# Patient Record
Sex: MALE | Race: WHITE | NOT HISPANIC OR LATINO | Employment: OTHER | URBAN - METROPOLITAN AREA
[De-identification: names, ages, dates, MRNs, and addresses within clinical notes are randomized per-mention and may not be internally consistent; named-entity substitution may affect disease eponyms.]

---

## 2024-01-30 ENCOUNTER — LAB VISIT (OUTPATIENT)
Dept: LAB | Facility: HOSPITAL | Age: 69
End: 2024-01-30
Attending: UROLOGY
Payer: MEDICARE

## 2024-01-30 ENCOUNTER — PATIENT MESSAGE (OUTPATIENT)
Dept: UROLOGY | Facility: CLINIC | Age: 69
End: 2024-01-30

## 2024-01-30 ENCOUNTER — OFFICE VISIT (OUTPATIENT)
Dept: UROLOGY | Facility: CLINIC | Age: 69
End: 2024-01-30
Payer: MEDICARE

## 2024-01-30 VITALS
WEIGHT: 250 LBS | DIASTOLIC BLOOD PRESSURE: 83 MMHG | BODY MASS INDEX: 33.86 KG/M2 | HEART RATE: 76 BPM | HEIGHT: 72 IN | SYSTOLIC BLOOD PRESSURE: 152 MMHG

## 2024-01-30 DIAGNOSIS — R97.20 ELEVATED PSA: ICD-10-CM

## 2024-01-30 DIAGNOSIS — R97.20 ELEVATED PSA: Primary | ICD-10-CM

## 2024-01-30 LAB
BILIRUBIN, UA POC OHS: NEGATIVE
BLOOD, UA POC OHS: NEGATIVE
CLARITY, UA POC OHS: CLEAR
COLOR, UA POC OHS: YELLOW
COMPLEXED PSA SERPL-MCNC: 3.6 NG/ML (ref 0–4)
GLUCOSE, UA POC OHS: NEGATIVE
KETONES, UA POC OHS: NEGATIVE
LEUKOCYTES, UA POC OHS: NEGATIVE
NITRITE, UA POC OHS: NEGATIVE
PH, UA POC OHS: 6
PROTEIN, UA POC OHS: NEGATIVE
SPECIFIC GRAVITY, UA POC OHS: 1.01
UROBILINOGEN, UA POC OHS: 0.2

## 2024-01-30 PROCEDURE — 99999PBSHW POCT URINALYSIS(INSTRUMENT): Mod: PBBFAC,,,

## 2024-01-30 PROCEDURE — 81003 URINALYSIS AUTO W/O SCOPE: CPT | Mod: PBBFAC,PO | Performed by: UROLOGY

## 2024-01-30 PROCEDURE — 99999 PR PBB SHADOW E&M-NEW PATIENT-LVL IV: CPT | Mod: PBBFAC,,, | Performed by: UROLOGY

## 2024-01-30 PROCEDURE — 84153 ASSAY OF PSA TOTAL: CPT | Mod: GA | Performed by: UROLOGY

## 2024-01-30 PROCEDURE — 36415 COLL VENOUS BLD VENIPUNCTURE: CPT | Performed by: UROLOGY

## 2024-01-30 PROCEDURE — G2211 COMPLEX E/M VISIT ADD ON: HCPCS | Mod: S$PBB,,, | Performed by: UROLOGY

## 2024-01-30 PROCEDURE — 99204 OFFICE O/P NEW MOD 45 MIN: CPT | Mod: PBBFAC,PO | Performed by: UROLOGY

## 2024-01-30 PROCEDURE — 99204 OFFICE O/P NEW MOD 45 MIN: CPT | Mod: S$PBB,,, | Performed by: UROLOGY

## 2024-01-30 PROCEDURE — 84154 ASSAY OF PSA FREE: CPT | Performed by: UROLOGY

## 2024-01-30 RX ORDER — MULTIVIT WITH IRON,MINERALS
TABLET ORAL
COMMUNITY
Start: 2020-03-01

## 2024-01-30 RX ORDER — AMLODIPINE BESYLATE 5 MG/1
5 TABLET ORAL
COMMUNITY

## 2024-01-30 RX ORDER — ZINC SULFATE 50(220)MG
CAPSULE ORAL
COMMUNITY
Start: 2020-03-01

## 2024-01-30 RX ORDER — NIACIN (INOSITOL NIACINATE) 400(500MG)
CAPSULE ORAL
COMMUNITY

## 2024-01-30 RX ORDER — TAMSULOSIN HYDROCHLORIDE 0.4 MG/1
1 CAPSULE ORAL
COMMUNITY

## 2024-01-30 RX ORDER — OLMESARTAN MEDOXOMIL 40 MG/1
40 TABLET ORAL
COMMUNITY
End: 2024-06-11

## 2024-01-30 NOTE — PATIENT INSTRUCTIONS
He has an elevated PSA of 4.26.  However only slightly increased from his PSA which was 7 months ago.  Did discuss waiting 3 months to rechecking a PSA to establish a base versus obtaining an MRI in wants to go ahead and proceed with MRI.  If MRI shows no lesions would recommend repeating a PSA in 3 months since digital rectal exam essentially negative.  However if PSA shows PI-RADS 3 or above will refer to [facility] for uronav biopsy and follow up here for results review.    Will also obtain screening PSA and PSA free and total at our lab to begin with.  In the future want all the labs lab.    They do live in [place] and offered to send them to [place] but they are here in [place] and in [facility] soon with family and want to keep all care here at [facility].    1. Elevated PSA        Plan:     Psa free and total AND psa screening for baseline today  Schedule prostate mri   Pirads 3-refer to main and fu here after  <Pirads 3 repeat psa free and total and fu after here with me. Bx if psa rising then.       We will obtain a prostate MRI to determine if you have any lesions that look suspicious for intermediate risk prostate cancer that would need treatment. (we call these target lesions).   If there are no target lesions This does not mean you do not have prostate cancer. You can have a false negative. . You WILL LIKELY STILL NEED a biopsy.     If your MRI shows a target lesion(s)- (>PIRADS 3).   Will refer you to [facility] urology in [place] for a special fusion prostate biopsy that uses the MRI results and ultrasound.   A urologist there will do the biopsy and you will return to see  to discuss the biopsy results  If you are unwilling to drive to [place] we could do the biopsy here but it would not be as helpful as the one done in [place]  If your MRI shows no target lesion(s) -(PIRADS 2 or less).   Will plan to repeat psa free and total in 3 months.    You still more than  likely will need a biopsy even if no lesions on MRI seen.   This does not mean you do not have prostate cancer. You can have a false negative.   A prostate biopsy is done awake at a special surgery suite. It is not painful. You will receive anti-anxiety medicine beforehand. A prostate biopsy is done by placing probe is through the rectum to obtain pieces of prostate for evaluation. There is no other way to check for prostate cancer.   The risks of a prostate biopsy include but are not limited to bleeding, infection, pain and need for further procedures.  The most serious risks is sepsis bc we go through the rectum.  The patient was told to stop all blood thinners at least one week prior to the procedure (however continue aspirin 81mg daily if told specifically by your UROLOGIST).    You will do a fleets enema the AM of the biopsy and begin a course of antibiotics beginning the PM prior to the procedure unless otherwise stated.   At minimum make sure you have a follow-up to see me in 6 months with a PSA beforehand and call our office if no appointment made for a lab or follow-up

## 2024-01-30 NOTE — PROGRESS NOTES
Psa back down to 3.6  Ok to proceed with mri (Not urgent) or just repeat psa in 3 months and fu after and do mri then if psa higher.

## 2024-01-30 NOTE — PROGRESS NOTES
[facility] - [place]  Staff: MD Ryan  PCP: Carole, Primary Doctor  Date of Service: 01/30/2024    CC: elevated psa      Subjective:        HPI: Zion Estrada is a 68 y.o. male     Initial consult by me in clinic for elevated psa on 1/30/24:    He was found to have an elevated PSA of 4.12 on 05/18/2023, it was repeated on 08/16/2023 and came down to 3.65 after using a prostate supplmenet and most recently back up to 4.26 on 12/20/2023.  Does not recall his PSAs prior to this but did have them checked previously.      He has not seen a urologist before.   Family history: He does not have a family history of prostate cancer.   BPH obstruction sx and bph meds: occ weak stream, no intermittency or hesitancy however his pcp did start him on flomax 0.4mg after his psa in December and he did notice a difference in his stream and now wakes up 2x a night down from 4 to 5x a night.   Not sexually active currently as wife had urogyn surgeries but would like to be so not sure if he has ED.   Contraindications for mri: No. Anticoagulation:  Yes - asa 81mg.  He has no history of previous abdominal surgeries.   He does have a cardiologist  ( at [facility]). No previous heart surgeries. Last colonoscopy 6 years ago- 1 polyp.   Ua today: neg.     Psa history: no family hx  1/30/24 BENJAMIN: 35g no nodules  12/20/23 4.26  8/16/23 3.65  5/18/23 4.12    Urine history: family history of kidney, bladder or prostate cancer:No, personal or family history of kidney stones: No,tobacco use: No, anticoagulation: Yes - asa 81mg  1/30/24 neg    Current REVIEW OF SYSTEMS:  See hpi for pertinent information    Past Medical History:   Diagnosis Date    Elevated PSA     Hypertension        Past Surgical History:   Procedure Laterality Date    VASECTOMY         Objective:     Vitals:    01/30/24 1019   BP: (!) 152/83   Pulse: 76       Focused  exam 1/30/24  Inspection of anus normal  No scrotal  "rashes, cysts or lesions  Epididymis normal in size, no tenderness  Testes normal and size, equal size bilaterally, no masses  Urethral meatus normal without discharge  Penis is circumcised  BENJAMIN: 35g gland without masses, tenderness. SV not palpable. Normal sphincter tone. +hemhorroids.  No bilateral inguinal hernias noted       ]      No results found for: "LABA1C", "HGBA1C"        Assessment:     Zion Estrada is a 68 y.o. male with     He has an elevated PSA of 4.26.  However only slightly increased from his PSA which was 7 months ago.  Did discuss waiting 3 months to rechecking a PSA to establish a base versus obtaining an MRI in wants to go ahead and proceed with MRI.  If MRI shows no lesions would recommend repeating a PSA in 3 months since digital rectal exam essentially negative.  However if PSA shows PI-RADS 3 or above will refer to [facility] for uronav biopsy and follow up here for results review.    Will also obtain screening PSA and PSA free and total at our lab to begin with.  In the future want all the labs lab.    They do live in [place] and offered to send them to [place] but they are here in [place] and in [facility] soon with family and want to keep all care here at [facility].    1. Elevated PSA        Plan:     Psa free and total AND psa screening for baseline today  Schedule prostate mri (2/8 to 2/20)  Pirads 3-refer to main and fu here after  <Pirads 3 repeat psa free and total and fu after here with me. Bx if psa rising then.       We will obtain a prostate MRI to determine if you have any lesions that look suspicious for intermediate risk prostate cancer that would need treatment. (we call these target lesions).   If there are no target lesions This does not mean you do not have prostate cancer. You can have a false negative. . You WILL LIKELY STILL NEED a biopsy.     If your MRI shows a target lesion(s)- (>PIRADS 3).   Will refer you to [facility] urology in [place] for a " special fusion prostate biopsy that uses the MRI results and ultrasound.   A urologist there will do the biopsy and you will return to see  to discuss the biopsy results  If you are unwilling to drive to [place] we could do the biopsy here but it would not be as helpful as the one done in [place]  If your MRI shows no target lesion(s) -(PIRADS 2 or less).   Will plan to repeat psa free and total in 3 months.    You still more than likely will need a biopsy even if no lesions on MRI seen.   This does not mean you do not have prostate cancer. You can have a false negative.   A prostate biopsy is done awake at a special surgery suite. It is not painful. You will receive anti-anxiety medicine beforehand. A prostate biopsy is done by placing probe is through the rectum to obtain pieces of prostate for evaluation. There is no other way to check for prostate cancer.   The risks of a prostate biopsy include but are not limited to bleeding, infection, pain and need for further procedures.  The most serious risks is sepsis bc we go through the rectum.  The patient was told to stop all blood thinners at least one week prior to the procedure (however continue aspirin 81mg daily if told specifically by your UROLOGIST).    You will do a fleets enema the AM of the biopsy and begin a course of antibiotics beginning the PM prior to the procedure unless otherwise stated.   At minimum make sure you have a follow-up to see me in 6 months with a PSA beforehand and call our office if no appointment made for a lab or follow-up

## 2024-01-31 ENCOUNTER — TELEPHONE (OUTPATIENT)
Dept: UROLOGY | Facility: CLINIC | Age: 69
End: 2024-01-31
Payer: MEDICARE

## 2024-01-31 LAB
PROSTATE SPECIFIC ANTIGEN, TOTAL: 3.7 NG/ML (ref 0–4)
PSA FREE MFR SERPL: 20.81 %
PSA FREE SERPL-MCNC: 0.77 NG/ML (ref 0–1.5)

## 2024-01-31 NOTE — TELEPHONE ENCOUNTER
----- Message from Terra Valencia sent at 1/31/2024  8:29 AM CST -----  Contact: pt [phone]  Type:  Patient Returning Call    Who Called:  Pt   Who Left Message for Patient:  Kitty   Does the patient know what this is regarding?:  Yes   Best Call Back Number:  [phone]    Additional Information:  Pls call back and advise

## 2024-02-05 ENCOUNTER — PATIENT MESSAGE (OUTPATIENT)
Dept: UROLOGY | Facility: CLINIC | Age: 69
End: 2024-02-05
Payer: MEDICARE

## 2024-02-05 ENCOUNTER — HOSPITAL ENCOUNTER (OUTPATIENT)
Dept: RADIOLOGY | Facility: HOSPITAL | Age: 69
Discharge: HOME | End: 2024-02-05
Attending: UROLOGY
Payer: MEDICARE

## 2024-02-05 DIAGNOSIS — R97.20 ELEVATED PSA: ICD-10-CM

## 2024-02-05 LAB
CREAT SERPL-MCNC: 0.7 MG/DL (ref 0.5–1.4)
SAMPLE: NORMAL

## 2024-02-05 PROCEDURE — 25500020 PHARM REV CODE 255

## 2024-02-05 PROCEDURE — A9585 GADOBUTROL INJECTION: HCPCS

## 2024-02-05 PROCEDURE — 72197 MRI PELVIS W/O & W/DYE: CPT | Mod: TC

## 2024-02-05 PROCEDURE — 72197 MRI PELVIS W/O & W/DYE: CPT | Mod: 26,,, | Performed by: RADIOLOGY

## 2024-02-05 RX ORDER — GADOBUTROL 604.72 MG/ML
INJECTION INTRAVENOUS
Status: COMPLETED
Start: 2024-02-05 | End: 2024-02-05

## 2024-02-05 RX ADMIN — GADOBUTROL 10 ML: 604.72 INJECTION INTRAVENOUS at 01:02

## 2024-02-05 NOTE — PROGRESS NOTES
Let him know his mri prostate showed a pirads 3 lesion. Which means there's a potential there could be cancer there but very low chance. Since his psa came back down to what it was in August I think we should just repeat his psa in 6 months and fu after that.     If he's good with this plan schedule him for psa free and total in 6 months and fu after this.     Psa history: no family hx  1/30/24            mri prostate vol: 53g. Pirads 3, right transition zone at the apex.  1/30/24            3.6,   BENJAMIN: 35g no nodules - 3.7, %free 20.81  12/20/23          4.26  8/16/23            3.65  5/18/23            4.12        PIRADS scale:  1 - Very low, clinically significant cancer unlikely to be present  2- Low, clinically significant cancer unlikely to be present  3- Intermediate, the presence of clinically sig cancer is equivocal  4- High, clinically sig cancer likely present  5 - Very High, clinically sig cancer highly likely to be present      Risk Stratification of Prostate Cancer According to PI-RADS® Version 2 Categories: [place]-Analysis for Prospective Studies  Epub 2020 Jul 27  https://pubmed.ncbi.nlm.nih.gov/63211968/  Results: Thirteen prospective studies including 4,265 men who underwent magnetic resonance imaging targeted biopsy and/or systematic biopsy for a PI-RADS v2 category 3 or greater, or systematic biopsy for PI-RADS 1-2 were included.     The pooled detection rates of clinically significant prostate cancer ([place] 7 or higher- meaning prostate cancer that would need treatment) monotonically increased for each   PI-RADS v2 category  4% (95% CI 2-8) for category 1-2  17% (95% CI 13-21) for category 3  46% (95% CI 38-55) for category 4  75% (95% CI 73-78) for category 5.     Substantial study heterogeneity was noted in clinically significant prostate cancer detection rates for categories 1-2 and 4, which were significantly affected by study subject selection (biopsy naïve patients only or not) and studies  with a high risk of bias.

## 2024-05-28 ENCOUNTER — LAB VISIT (OUTPATIENT)
Dept: LAB | Facility: HOSPITAL | Age: 69
End: 2024-05-28
Attending: UROLOGY
Payer: MEDICARE

## 2024-05-28 DIAGNOSIS — R97.20 ELEVATED PSA: ICD-10-CM

## 2024-05-28 PROCEDURE — 84154 ASSAY OF PSA FREE: CPT | Performed by: UROLOGY

## 2024-05-28 PROCEDURE — 36415 COLL VENOUS BLD VENIPUNCTURE: CPT | Performed by: UROLOGY

## 2024-05-28 PROCEDURE — 84153 ASSAY OF PSA TOTAL: CPT | Performed by: UROLOGY

## 2024-05-29 LAB
PROSTATE SPECIFIC ANTIGEN, TOTAL: 3 NG/ML (ref 0–4)
PSA FREE MFR SERPL: 25 %
PSA FREE SERPL-MCNC: 0.75 NG/ML (ref 0–1.5)

## 2024-06-11 ENCOUNTER — OFFICE VISIT (OUTPATIENT)
Dept: UROLOGY | Facility: CLINIC | Age: 69
End: 2024-06-11
Payer: MEDICARE

## 2024-06-11 VITALS
HEIGHT: 72 IN | DIASTOLIC BLOOD PRESSURE: 87 MMHG | BODY MASS INDEX: 33.18 KG/M2 | WEIGHT: 245 LBS | HEART RATE: 100 BPM | SYSTOLIC BLOOD PRESSURE: 145 MMHG

## 2024-06-11 DIAGNOSIS — N40.0 BENIGN PROSTATIC HYPERPLASIA, UNSPECIFIED WHETHER LOWER URINARY TRACT SYMPTOMS PRESENT: ICD-10-CM

## 2024-06-11 DIAGNOSIS — R97.20 ELEVATED PSA: Primary | ICD-10-CM

## 2024-06-11 LAB
BILIRUBIN, UA POC OHS: NEGATIVE
BLOOD, UA POC OHS: NEGATIVE
CLARITY, UA POC OHS: CLEAR
COLOR, UA POC OHS: YELLOW
GLUCOSE, UA POC OHS: NEGATIVE
KETONES, UA POC OHS: NEGATIVE
LEUKOCYTES, UA POC OHS: ABNORMAL
NITRITE, UA POC OHS: NEGATIVE
PH, UA POC OHS: 6
POC RESIDUAL URINE VOLUME: 72 ML (ref 0–100)
PROTEIN, UA POC OHS: NEGATIVE
SPECIFIC GRAVITY, UA POC OHS: 1.02
UROBILINOGEN, UA POC OHS: 0.2

## 2024-06-11 PROCEDURE — 99999 PR PBB SHADOW E&M-EST. PATIENT-LVL III: CPT | Mod: PBBFAC,,, | Performed by: UROLOGY

## 2024-06-11 PROCEDURE — 81003 URINALYSIS AUTO W/O SCOPE: CPT | Mod: PBBFAC,PO | Performed by: UROLOGY

## 2024-06-11 PROCEDURE — 99213 OFFICE O/P EST LOW 20 MIN: CPT | Mod: PBBFAC,PO | Performed by: UROLOGY

## 2024-06-11 PROCEDURE — 99214 OFFICE O/P EST MOD 30 MIN: CPT | Mod: S$PBB,,, | Performed by: UROLOGY

## 2024-06-11 PROCEDURE — 99999PBSHW POCT BLADDER SCAN: Mod: PBBFAC,,,

## 2024-06-11 PROCEDURE — 51798 US URINE CAPACITY MEASURE: CPT | Mod: PBBFAC,PO | Performed by: UROLOGY

## 2024-06-11 PROCEDURE — 99999PBSHW POCT URINALYSIS(INSTRUMENT): Mod: PBBFAC,,,

## 2024-06-11 RX ORDER — TRIAMCINOLONE ACETONIDE 1 MG/G
CREAM TOPICAL
COMMUNITY
Start: 2024-06-10

## 2024-06-11 RX ORDER — LOSARTAN POTASSIUM 50 MG/1
TABLET ORAL
COMMUNITY
Start: 2024-01-01

## 2024-06-11 NOTE — PATIENT INSTRUCTIONS
Zion Estrada is a 68 y.o. male with       1. Elevated PSA    2. Benign prostatic hyperplasia, unspecified whether lower urinary tract symptoms present      Psa down to 3.0. urine with small leuk wo uti sx. Prostate 53g. Suspect rise and fall of psa's due to asymptomatic prostatitis and some incomplete emptying (pvr 74). Therefore recommend increasing flomax to 2 a night. Bc psa lower can change back to yearly monitoring.     Plan:     Amolodipine in am  Try increasing flomax to 2 at night. Especially since pvr is 72 today and urine (circ) shows small leuk (no uti symptoms). Can call for refill for 90d supply if helping.   Do not take both together  Psa free and total in a year and fu after for aua ssx, pvr and urine check  Follow up sooner if having any changes in urine or psa

## 2024-06-11 NOTE — PROGRESS NOTES
[facility] - [place]  Staff: MD Ryan  PCP: Carole, Primary Doctor  Date of Service: 06/11/2024    CC: elevated psa      Subjective:        HPI: Zion Estrada is a 68 y.o. male     Initial consult by me in clinic for elevated psa on 1/30/24:    He was found to have an elevated PSA of 4.12 on 05/18/2023, it was repeated on 08/16/2023 and came down to 3.65 after using a prostate supplmenet and most recently back up to 4.26 on 12/20/2023.  Does not recall his PSAs prior to this but did have them checked previously.      He has not seen a urologist before.   Family history: He does not have a family history of prostate cancer.   BPH obstruction sx and bph meds: occ weak stream, no intermittency or hesitancy however his pcp did start him on flomax 0.4mg after his psa in December and he did notice a difference in his stream and now wakes up 2x a night down from 4 to 5x a night.   Not sexually active currently as wife had urogyn surgeries but would like to be so not sure if he has ED.   Contraindications for mri: No. Anticoagulation:  Yes - asa 81mg.  He has no history of previous abdominal surgeries.   He does have a cardiologist  ( at [facility]). No previous heart surgeries. Last colonoscopy 6 years ago- 1 polyp.   Ua today: neg.     Interval history by ME/ in CLINIC on 6/11/24 for elevated psa:  Had him repeat psa 1/30/24 3.7, %free 20.81 - psa screen 3.7  He also did a prostate mri on 2/5/24 and it showed 5.8 x 4.4 x 4.2cm corresponding to a computed volume of 53cc. PIRADS 3. Location: right; region: apex; zone: anterior. 2.5C,  Bc psa was lower decided to repeat psa on 5/28/24 and it came down to 3.0, %free 25.   Urine today with small leuk. No dysuria. No increased frequency or urgency  Taking flomax 0.4- he says good flow  Pvr by scan: 74          Psa history: no family hx  5/28/24 3.0, %free 25  2/5/24  Mri prostate: 5.8 x 4.4 x 4.2cm  "corresponding to a computed volume of 53cc. PIRADS 3. Location: right; region: apex; zone: anterior. 2.5C,  1/30/24 3.7, %free 20.81 - psa screen 3.7  1/30/24 BENJAMIN: 35g no nodules  12/20/23 4.26  8/16/23 3.65  5/18/23 4.12    Urine history: family history of kidney, bladder or prostate cancer:No, personal or family history of kidney stones: No,tobacco use: No, anticoagulation: Yes - asa 81mg  6/11/24  Small leuk  1/30/24 neg    Current REVIEW OF SYSTEMS:  See hpi for pertinent information    Past Medical History:   Diagnosis Date    Elevated PSA     Hypertension        Past Surgical History:   Procedure Laterality Date    VASECTOMY         Objective:     Vitals:    06/11/24 1427   BP: (!) 145/87   Pulse: 100       Focused  exam 1/30/24  Inspection of anus normal  No scrotal rashes, cysts or lesions  Epididymis normal in size, no tenderness  Testes normal and size, equal size bilaterally, no masses  Urethral meatus normal without discharge  Penis is circumcised  BENJAMIN: 35g gland without masses, tenderness. SV not palpable. Normal sphincter tone. +hemhorroids.  No bilateral inguinal hernias noted       ]    No results found for: "LABA1C", "HGBA1C"      Assessment:     Zion Estrada is a 68 y.o. male with       1. Elevated PSA    2. Benign prostatic hyperplasia, unspecified whether lower urinary tract symptoms present      Psa down to 3.0. urine with small leuk wo uti sx. Prostate 53g. Suspect rise and fall of psa's due to asymptomatic prostatitis and some incomplete emptying (pvr 74). Therefore recommend increasing flomax to 2 a night. Bc psa lower can change back to yearly monitoring.     Plan:     Amolodipine in am  Try increasing flomax to 2 at night. Especially since pvr is 72 today and urine (circ) shows small leuk (no uti symptoms). Can call for refill for 90d supply if helping.   Do not take both together  Psa free and total in a year and fu after for aua ssx, pvr and urine check  Follow up sooner if having " any changes in urine or psa

## 2024-06-21 ENCOUNTER — PATIENT MESSAGE (OUTPATIENT)
Dept: UROLOGY | Facility: CLINIC | Age: 69
End: 2024-06-21
Payer: MEDICARE

## 2024-06-24 RX ORDER — TAMSULOSIN HYDROCHLORIDE 0.4 MG/1
0.8 CAPSULE ORAL NIGHTLY
Qty: 180 CAPSULE | Refills: 3 | Status: SHIPPED | OUTPATIENT
Start: 2024-06-24 | End: 2025-06-24

## 2025-05-30 ENCOUNTER — TELEPHONE (OUTPATIENT)
Dept: UROLOGY | Facility: CLINIC | Age: 70
End: 2025-05-30
Payer: MEDICARE

## 2025-05-30 NOTE — TELEPHONE ENCOUNTER
Spoke with patient's wife appointment rescheduled for 8/28 to 9/2 due to provider being out of the office. Wife verbally voiced understanding.

## 2025-08-30 ENCOUNTER — PATIENT MESSAGE (OUTPATIENT)
Dept: UROLOGY | Facility: CLINIC | Age: 70
End: 2025-08-30
Payer: MEDICARE

## 2025-09-02 ENCOUNTER — OFFICE VISIT (OUTPATIENT)
Dept: UROLOGY | Facility: CLINIC | Age: 70
End: 2025-09-02
Payer: MEDICARE

## 2025-09-02 ENCOUNTER — PATIENT MESSAGE (OUTPATIENT)
Dept: UROLOGY | Facility: CLINIC | Age: 70
End: 2025-09-02

## 2025-09-02 ENCOUNTER — LAB VISIT (OUTPATIENT)
Dept: LAB | Facility: HOSPITAL | Age: 70
End: 2025-09-02
Attending: UROLOGY
Payer: MEDICARE

## 2025-09-02 VITALS — HEIGHT: 72 IN | BODY MASS INDEX: 33.23 KG/M2

## 2025-09-02 DIAGNOSIS — N40.0 BENIGN PROSTATIC HYPERPLASIA, UNSPECIFIED WHETHER LOWER URINARY TRACT SYMPTOMS PRESENT: ICD-10-CM

## 2025-09-02 DIAGNOSIS — N40.0 BENIGN PROSTATIC HYPERPLASIA, UNSPECIFIED WHETHER LOWER URINARY TRACT SYMPTOMS PRESENT: Primary | ICD-10-CM

## 2025-09-02 DIAGNOSIS — R97.20 ELEVATED PSA: Primary | ICD-10-CM

## 2025-09-02 DIAGNOSIS — R35.1 NOCTURIA: ICD-10-CM

## 2025-09-02 LAB
POC RESIDUAL URINE VOLUME: 0 ML (ref 0–100)
PSA FREE MFR SERPL: 13.71 %
PSA FREE SERPL-MCNC: 0.81 NG/ML
PSA SERPL-MCNC: 5.91 NG/ML

## 2025-09-02 PROCEDURE — 99214 OFFICE O/P EST MOD 30 MIN: CPT | Mod: S$PBB,,, | Performed by: UROLOGY

## 2025-09-02 PROCEDURE — 84154 ASSAY OF PSA FREE: CPT

## 2025-09-02 PROCEDURE — 99213 OFFICE O/P EST LOW 20 MIN: CPT | Mod: PBBFAC,PO | Performed by: UROLOGY

## 2025-09-02 PROCEDURE — 99999 PR PBB SHADOW E&M-EST. PATIENT-LVL III: CPT | Mod: PBBFAC,,, | Performed by: UROLOGY

## 2025-09-02 PROCEDURE — 51798 US URINE CAPACITY MEASURE: CPT | Mod: PBBFAC,PO | Performed by: UROLOGY

## 2025-09-02 PROCEDURE — 99999PBSHW POCT BLADDER SCAN: Mod: PBBFAC,,,

## 2025-09-02 PROCEDURE — 36415 COLL VENOUS BLD VENIPUNCTURE: CPT

## 2025-09-02 RX ORDER — FINASTERIDE 5 MG/1
5 TABLET, FILM COATED ORAL EVERY MORNING
Qty: 90 TABLET | Refills: 3 | Status: SHIPPED | OUTPATIENT
Start: 2025-09-02 | End: 2026-09-02

## 2025-09-02 RX ORDER — TAMSULOSIN HYDROCHLORIDE 0.4 MG/1
0.8 CAPSULE ORAL NIGHTLY
Qty: 180 CAPSULE | Refills: 3 | Status: SHIPPED | OUTPATIENT
Start: 2025-09-02 | End: 2026-03-01